# Patient Record
Sex: FEMALE | Race: WHITE
[De-identification: names, ages, dates, MRNs, and addresses within clinical notes are randomized per-mention and may not be internally consistent; named-entity substitution may affect disease eponyms.]

---

## 2018-04-23 ENCOUNTER — HOSPITAL ENCOUNTER (EMERGENCY)
Dept: HOSPITAL 25 - UCCORT | Age: 18
Discharge: HOME | End: 2018-04-23
Payer: COMMERCIAL

## 2018-04-23 VITALS — DIASTOLIC BLOOD PRESSURE: 72 MMHG | SYSTOLIC BLOOD PRESSURE: 118 MMHG

## 2018-04-23 DIAGNOSIS — J10.1: Primary | ICD-10-CM

## 2018-04-23 PROCEDURE — G0463 HOSPITAL OUTPT CLINIC VISIT: HCPCS

## 2018-04-23 PROCEDURE — 71046 X-RAY EXAM CHEST 2 VIEWS: CPT

## 2018-04-23 PROCEDURE — 99212 OFFICE O/P EST SF 10 MIN: CPT

## 2018-04-23 PROCEDURE — 87502 INFLUENZA DNA AMP PROBE: CPT

## 2018-04-23 NOTE — UC
UC General HPI





- HPI Summary


HPI Summary: 





pt is c/o 2.5 days of fever, chills, bodyaches, cough and feeling sob. had 

diarrhea x1  but no vomiting. has had head and chest congestion as well.





- History of Current Complaint


Chief Complaint: UCGeneralIllness


Stated Complaint: FEVER/BODY ACHES


Time Seen by Provider: 04/23/18 12:58


Hx Obtained From: Patient


Hx Last Menstrual Period: depo provera


Onset/Duration: Sudden Onset


Timing: Constant


Pain Intensity: 0


Aggravating: nothing


Alleviating: motrin


Associated Signs & Symptoms: Positive: Cough, Diarrhea, Fever, Headache, SOB, 

Wheezing.  Negative: Chest Pain





- Allergy/Home Medications


Allergies/Adverse Reactions: 


 Allergies











Allergy/AdvReac Type Severity Reaction Status Date / Time


 


No Known Allergies Allergy   Verified 04/23/18 12:50











Home Medications: 


 Home Medications





Ibuprofen TAB* [Advil TAB*] 200 mg PO Q6H PRN 04/23/18 [History Confirmed 04/23/ 18]











PMH/Surg Hx/FS Hx/Imm Hx


Previously Healthy: Yes





- Surgical History


Surgical History: None





- Family History


Known Family History: Positive: None





- Social History


Occupation: Employed Part-time, Student


Lives: With Family


Alcohol Use: None


Substance Use Type: None


Smoking Status (MU): Never Smoked Tobacco





- Immunization History


Most Recent Influenza Vaccination: 2 weeks ago


Vaccination Up to Date: Yes





Review of Systems


Constitutional: Fever, Chills


Skin: Negative


Eyes: Negative


ENT: Nasal Discharge


Respiratory: Shortness Of Breath, Cough


Cardiovascular: Negative


Gastrointestinal: Diarrhea


Genitourinary: Negative


Motor: Negative


Neurovascular: Negative


Musculoskeletal: Negative


Neurological: Headache


Psychological: Negative


Is Patient Immunocompromised?: No


All Other Systems Reviewed And Are Negative: Yes





Physical Exam


Triage Information Reviewed: Yes


Appearance: Well-Appearing


Vital Signs: 


 Initial Vital Signs











Temp  100.8 F   04/23/18 12:48


 


Pulse  116   04/23/18 12:48


 


Resp  22   04/23/18 12:48


 


BP  118/72   04/23/18 12:48


 


Pulse Ox  99   04/23/18 12:48











Vital Signs Reviewed: Yes


Eyes: Positive: Conjunctiva Clear


ENT: Positive: Pharynx normal, Nasal congestion, TMs normal.  Negative: Nasal 

drainage


Neck: Positive: Supple, Nontender, No Lymphadenopathy.  Negative: Nuchal 

Rigidity


Respiratory: Positive: Lungs clear, No respiratory distress, Decreased breath 

sounds


Cardiovascular: Positive: No Murmur, Brisk Capillary Refill, Tachycardia


Abdomen Description: Positive: Nontender, No Organomegaly, Soft.  Negative: 

Distended, Guarding


Bowel Sounds: Positive: Present


Musculoskeletal: Positive: ROM Intact


Neurological: Positive: Alert


Psychological: Positive: Age Appropriate Behavior


Skin Exam: Normal





Diagnostics





- Laboratory


Diagnostic Studies Completed/Ordered: B+ flu, Wet read CXR=nad





- Radiology


  ** No standard instances


Xray Interpretation: No Acute Changes


Radiology Interpretation Completed By: Radiologist





Course/Dx





- Course


Course Of Treatment: rapid flu B+. TAMIFLU DECLINED.





- Differential Dx - Multi-Symptom


Provider Diagnoses: Influenza B





Discharge





- Sign-Out/Discharge


Documenting (check all that apply): Discharge/Admit/Transfer





- Discharge Plan


Condition: Stable


Disposition: HOME


Prescriptions: 


Albuterol HFA INHALER* [Ventolin HFA Inhaler*] 2 puff INH Q6H #1 mdi


Patient Education Materials:  Influenza (ED)


Forms:  *Work Release, *School Release


Referrals: 


MUNA Cummins [Primary Care Provider] - 7 Days





- Billing Disposition and Condition


Condition: STABLE


Disposition: HOME

## 2018-04-23 NOTE — RAD
HISTORY: Fever, cough, shortness of breath



COMPARISONS: None



VIEWS: 4: Frontal dual-energy and lateral views of the chest.



FINDINGS:

CARDIOMEDIASTINAL SILHOUETTE: The cardiomediastinal silhouette is normal.

CECILE: The cecile are normal.

PLEURA: The costophrenic angles are sharp. No pleural abnormalities are noted.

LUNG PARENCHYMA: The lungs are clear.

ABDOMEN: The upper abdomen is clear. There is no subphrenic gas.

BONES AND SOFT TISSUES: No bone or soft tissue abnormalities are noted.

OTHER: None.



IMPRESSION:

NO ACTIVE CARDIOPULMONARY DISEASE.

## 2018-10-01 ENCOUNTER — HOSPITAL ENCOUNTER (EMERGENCY)
Dept: HOSPITAL 25 - UCCORT | Age: 18
Discharge: HOME | End: 2018-10-01
Payer: COMMERCIAL

## 2018-10-01 VITALS — DIASTOLIC BLOOD PRESSURE: 65 MMHG | SYSTOLIC BLOOD PRESSURE: 103 MMHG

## 2018-10-01 DIAGNOSIS — J02.8: Primary | ICD-10-CM

## 2018-10-01 PROCEDURE — G0463 HOSPITAL OUTPT CLINIC VISIT: HCPCS

## 2018-10-01 PROCEDURE — 87651 STREP A DNA AMP PROBE: CPT

## 2018-10-01 PROCEDURE — 99211 OFF/OP EST MAY X REQ PHY/QHP: CPT

## 2018-10-01 NOTE — UC
Throat Pain/Nasal Jeffy HPI





- HPI Summary


HPI Summary: 


18-year-old female presents with one-week history of sore throat.  Associated 

with chills, nasal congestion, postnasal drip, and a occasionally productive 

cough for green sputum.  Denies fever, ear pain or drainage, dysphagia, chest 

pain, shortness of breath, wheezing, abdominal pain, nausea, or vomiting. 

Patient reports history of RAD with flu last year.








- History of Current Complaint


Chief Complaint: UCGeneralIllness


Stated Complaint: FEVER,CHILLS,SORE THROAT


Time Seen by Provider: 10/01/18 18:11


Hx Obtained From: Patient


Hx Last Menstrual Period: depo


Onset/Duration: Gradual Onset, Lasting Days - 7


Severity: Mild


Pain Intensity: 3


Cough: Productive


Associated Signs & Symptoms: Positive: Sinus Discomfort, Nasal Discharge.  

Negative: Dysphagia, Wheezing, Hoarseness, Fever, Vomiting, Rash





- Allergies/Home Medications


Allergies/Adverse Reactions: 


 Allergies











Allergy/AdvReac Type Severity Reaction Status Date / Time


 


No Known Allergies Allergy   Verified 10/01/18 17:17











Home Medications: 


 Home Medications





Albuterol HFA INHALER* [Ventolin HFA Inhaler*] 2 puff INH Q6H PRN 10/01/18 [

History Confirmed 10/01/18]


Eucalyptus/Menthol [Cherry Cough Drops] 1 carole MT DAILY PRN 10/01/18 [History 

Confirmed 10/01/18]


Loratadine [Claritin 10 MG CAP] 10 mg PO DAILY PRN 10/01/18 [History Confirmed 

10/01/18]











PMH/Surg Hx/FS Hx/Imm Hx


Previously Healthy: Yes - Denies significant PMH





- Surgical History


Surgical History: None





- Family History


Known Family History: Positive: Hypertension


Family History: Noncontributory





- Social History


Occupation: Student


Lives: Dormitory/Roommates


Alcohol Use: Rare


Substance Use Type: None


Smoking Status (MU): Never Smoked Tobacco





- Immunization History


Most Recent Influenza Vaccination: 2 weeks ago


Vaccination Up to Date: Yes





Review of Systems


Constitutional: Chills


Skin: Negative


Eyes: Negative


ENT: Sore Throat, Nasal Discharge, Sinus Congestion


Respiratory: Cough


Cardiovascular: Negative


Gastrointestinal: Negative


Is Patient Immunocompromised?: No


All Other Systems Reviewed And Are Negative: Yes





Physical Exam


Triage Information Reviewed: Yes


Appearance: Well-Appearing, No Pain Distress, Well-Nourished


Vital Signs: 


 Initial Vital Signs











Temp  98.4 F   10/01/18 17:20


 


Pulse  85   10/01/18 17:20


 


Resp  18   10/01/18 17:20


 


BP  103/65   10/01/18 17:20


 


Pulse Ox  100   10/01/18 17:20











Eyes: Positive: Conjunctiva Clear.  Negative: Discharge


ENT: Positive: Hearing grossly normal, Pharyngeal erythema - Mild erythema with 

cobblestoning, Nasal congestion, Nasal drainage, TMs normal, Uvula midline.  

Negative: Tonsillar swelling, Tonsillar exudate, Sinus tenderness


Neck: Positive: Supple, Nontender, No Lymphadenopathy


Respiratory: Positive: Lungs clear, Normal breath sounds, No respiratory 

distress


Cardiovascular: Positive: RRR, No Murmur


Neurological: Positive: Alert


Skin Exam: Normal





Throat Pain/Nasal Course/Dx





- Course


Course Of Treatment: 18 year old female with 1 week history of sore throat. 

Afebrile. Rapid strep negative. Symptoms likely viral. Recommend symptomatic 

treatment. Patient verbalizes understanding and agrees with POC.





- Differential Dx/Diagnosis


Provider Diagnoses: Viral pharyngitis





Discharge





- Sign-Out/Discharge


Documenting (check all that apply): Patient Departure


All imaging exams completed and their final reports reviewed: No Studies





- Discharge Plan


Condition: Stable


Disposition: HOME


Patient Education Materials:  Pharyngitis (ED)


Referrals: 


MUNA Cummins [Primary Care Provider] - 7 Days (If symptoms persist.)


Additional Instructions: 


Your rapid strep test performed in the clinic today was negative. Your symptoms 

are likely from a viral infection.  Viral infections do not respond to 

antibiotics and typically run their course over 7-10 days.





Drink plenty of fluids to stay well hydrated.





Gargle with salt water several times a day to help with sore throat.





Take acetaminophen (Tylenol) or ibuprofen (Motrin, Advil) according to 

directions as needed for aches pains or fever.





You may also use Chloraseptic spray or Cepacol lozenges for some temporary pain 

relief for your sore throat.





Follow-up with your primary care provider in 7 days if your symptoms persist.





Seek immediate medical attention if you have a persistent fever greater than 

100.5 F despite taking acetaminophen or ibuprofen, you are unable to swallow, 

have difficulty breathing, or any worsening of symptoms.





- Billing Disposition and Condition


Condition: STABLE


Disposition: Home